# Patient Record
Sex: MALE | Race: WHITE | NOT HISPANIC OR LATINO | Employment: OTHER | URBAN - METROPOLITAN AREA
[De-identification: names, ages, dates, MRNs, and addresses within clinical notes are randomized per-mention and may not be internally consistent; named-entity substitution may affect disease eponyms.]

---

## 2017-01-05 RX ORDER — AMANTADINE HYDROCHLORIDE 100 MG/1
100 CAPSULE, GELATIN COATED ORAL 2 TIMES DAILY
Qty: 180 CAPSULE | Refills: 3 | Status: SHIPPED | OUTPATIENT
Start: 2017-01-05 | End: 2017-12-19 | Stop reason: SDUPTHER

## 2017-01-12 ENCOUNTER — OFFICE VISIT (OUTPATIENT)
Dept: NEUROLOGY | Facility: CLINIC | Age: 52
End: 2017-01-12
Payer: COMMERCIAL

## 2017-01-12 VITALS
DIASTOLIC BLOOD PRESSURE: 74 MMHG | WEIGHT: 217 LBS | HEIGHT: 72 IN | SYSTOLIC BLOOD PRESSURE: 110 MMHG | HEART RATE: 91 BPM | BODY MASS INDEX: 29.39 KG/M2

## 2017-01-12 DIAGNOSIS — G47.9 SLEEP DISORDER: ICD-10-CM

## 2017-01-12 DIAGNOSIS — F63.9 IMPULSE CONTROL DISORDER: ICD-10-CM

## 2017-01-12 DIAGNOSIS — G20.A1 PARKINSON'S DISEASE: Primary | ICD-10-CM

## 2017-01-12 DIAGNOSIS — R42 VERTIGO: ICD-10-CM

## 2017-01-12 PROCEDURE — 99214 OFFICE O/P EST MOD 30 MIN: CPT | Mod: S$GLB,,, | Performed by: PSYCHIATRY & NEUROLOGY

## 2017-01-12 PROCEDURE — 99999 PR PBB SHADOW E&M-EST. PATIENT-LVL II: CPT | Mod: PBBFAC,,, | Performed by: PSYCHIATRY & NEUROLOGY

## 2017-01-12 PROCEDURE — 1159F MED LIST DOCD IN RCRD: CPT | Mod: S$GLB,,, | Performed by: PSYCHIATRY & NEUROLOGY

## 2017-01-12 RX ORDER — PRAMIPEXOLE DIHYDROCHLORIDE 0.5 MG/1
0.5 TABLET ORAL 3 TIMES DAILY
Qty: 90 TABLET | Refills: 4 | Status: SHIPPED | OUTPATIENT
Start: 2017-01-12 | End: 2017-02-22 | Stop reason: SDUPTHER

## 2017-01-12 RX ORDER — PRAMIPEXOLE DIHYDROCHLORIDE 0.5 MG/1
0.5 TABLET ORAL 3 TIMES DAILY
Qty: 90 TABLET | Refills: 4 | Status: SHIPPED | OUTPATIENT
Start: 2017-01-12 | End: 2017-01-12 | Stop reason: SDUPTHER

## 2017-01-12 NOTE — MR AVS SNAPSHOT
[facility]  [address] [facility] [place] [address]  Phone: [phone]  Fax: [phone]                  Reece Hunter III   2017 2:00 PM   Office Visit    Description:  Male : 1965   Provider:  Madison Tracey MD   Department:  [facility]           Reason for Visit     Parkinson's Disease           Diagnoses this Visit        Comments    Parkinson's disease    -  Primary     Sleep disorder         Vertigo         Impulse control disorder                To Do List           Future Appointments        Provider Department Dept Phone    2017 8:00 AM [facility] [phone]    2017 8:30 AM [facility] [phone]    2017 11:30 AM [facility] [phone]    2017 9:20 AM Madison Tracey MD [facility] [phone]      Goals (5 Years of Data)     None      Follow-Up and Disposition     Return in about 3 months (around 2017) for PD.    Follow-up and Disposition History       These Medications        Disp Refills Start End    pramipexole (MIRAPEX) 0.5 MG tablet 90 tablet 4 2017     Take 1 tablet (0.5 mg total) by mouth 3 (three) times daily. - Oral    Pharmacy: [email or web address] #: [phone]         [facility] Call     [facility] Care [facility] -  Assistance  Registered nurses in the [facility] provide clinical advisement, health education, appointment booking, and other advisory services.  Call for this free service at [phone].             Medications           Message regarding Medications     Verify the changes and/or additions to your medication regime listed below are the same as discussed with your clinician today.  If any of these changes or additions are incorrect, please notify your healthcare provider.        CHANGE how you are taking these medications      Start Taking Instead of    pramipexole (MIRAPEX) 0.5 MG tablet pramipexole (MIRAPEX) 0.75 MG tablet    Dosage:  Take 1 tablet (0.5 mg total) by mouth 3 (three) times daily. Dosage:  Take 0.75 mg by mouth 3 (three) times daily.    Reason for Change:  Reorder            Verify that the below list of medications is an accurate representation of the medications you are currently taking.  If none reported, the list may be blank. If incorrect, please contact your healthcare provider. Carry this list with you in case of emergency.           Current Medications     amantadine HCl (SYMMETREL) 100 mg capsule Take 1 capsule (100 mg total) by mouth 2 (two) times daily. Take 100 mg by mouth 2 (two) times daily.    carbidopa-levodopa-entacapone -200 mg (STALEVO) -200 mg Tab Take 1 tablet by mouth 4 (four) times daily.    pramipexole (MIRAPEX) 0.5 MG tablet Take 1 tablet (0.5 mg total) by mouth 3 (three) times daily.    selegiline HCl (ELDEPRYL) 5 mg tablet TAKE TWO TABLETS BY MOUTH EVERY DAY    sildenafil (VIAGRA) 50 MG tablet Take 1 tablet (50 mg total) by mouth daily as needed for Erectile Dysfunction.           Clinical Reference Information           Vital Signs - Last Recorded  Most recent update: 1/12/2017  2:36 PM by Annie Espinoza MA    BP Pulse Ht Wt BMI    110/74 (BP Location: Left arm, Patient Position: Sitting, BP Method: Automatic) 91 6' (1.829 m) 98.4 kg (217 lb) 29.43 kg/m2      Blood Pressure          Most Recent Value    BP  110/74      Allergies as of 1/12/2017     Doxycycline      Immunizations Administered on Date of Encounter - 1/12/2017     None      Orders Placed During Today's Visit     Future Labs/Procedures Expected by Expires    MRA Brain  1/12/2017 1/12/2018    MRA Neck W WO Contrast  1/12/2017 1/12/2018    MRI Brain Without Contrast  1/12/2017 1/12/2018      [facility] Sign-Up     Activating your [facility] account is as easy as 1-2-3!     1) Visit [email or web address], select Sign Up Now,  enter this activation code and your date of birth, then select Next.  [email or web address]  Expires: 2/26/2017  2:32 PM      2) Create a username and password to use when you visit [facility] in the future and select a security question in case you lose your password and select Next.    3) Enter your e-mail address and click Sign Up!    Additional Information  If you have questions, please e-mail [email or web address] or call [phone] to talk to our [facility] staff. Remember, [facility] is NOT to be used for urgent needs. For medical emergencies, dial 911.         Instructions        Upcoming Dates I will be out of the office:  February 24th- March 1st  April 10- 14

## 2017-01-12 NOTE — PROGRESS NOTES
"Reece Hunter III  I. Chief Complaints during this visit:  New Patient visit for  PD    No referring provider defined for this encounter.    Primary Care Physician  Primary Doctor No  No address on file        History of present illness:   51 y.o.  male seen in f/u for PD.  Accompanied by wife of 20yrs.  Had vertigo yesterday, spinning with nausea.  This is his third episode in 15 years.  Resolved with meclizine.    For past 2 weeks, his legs have felt "dead" or "like logs."  More difficulty picking up feet.    Sleep has been fragmented in this time frame.  Sleeping pill causes hang-over effect.    Volunteers that he is surfing tv at night for sexual content.  Libido is higher than in past.  Also over-spending.  Gives example of spending more than $20 on lunch when "I didn't need to."    Interval history 9/30/16:  Could not tolerate going off of amantadine, though he was "dying."  Went back on it and then up on the stalevo and feels better.  Yells at night.  Bedtime around 10pm  No hypersexuality, over-spending, sleep attacks.    Interval history 9/12/16:  ..consultation at the request of  Dr. Banks for evaluation of PD.  Accompanied by sister.  Wife taking care of her dad.  Darren is retiring, so he needs new neurologist.  Also followed at [facility] (Akshat De La Vega) about 2x/year.  Legs feel heavy, he is slower, speech stutters.  Left side is worse than right.  Constipation.  Intermittent urinary leakage.  He is still on 75 of stalevo (x 4 years).  Has tremor intermittently bilateral.  No diplopia.  Easily tearful.  Endorses feeling of someone behind him for past couple months (1-2x/week).  , owns company, still working, but struggling.  Driving is more difficult- turning and reactions.  He has been pulled over a couple of times.    Screams in sleep, grabbed wife in sleep, so she is no longer sleeping in same room.  He sleeps about 3-4hrs at a time.  Taking clonazepam.    Previously a research " patient for [employer] (pioglitazone) and, later, [employer] (inosine) with Dr. Dillard.  Dr. Dillard also took care of his mother for PD.      II.  Review of systems  As in HPI,  otherwise, balance 3 systems reviewed and are negative.    III.  Past Medical History   Diagnosis Date    Parkinson's disease 12/21/2012     Family History   Problem Relation Age of Onset    Parkinsonism Mother      Social History     Social History    Marital status:      Spouse name: N/A    Number of children: N/A    Years of education: N/A     Social History Main Topics    Smoking status: Never Smoker    Smokeless tobacco: Not on file    Alcohol use 1.8 oz/week     3 Cans of beer per week    Drug use: No    Sexual activity: Not on file     Other Topics Concern    Not on file     Social History Narrative    Has twin girls, 13       Current meds:  stalevo 100 qid (6am, 11am, 4pm and 9pm)  mirapex 0.75mg tid (6, 11, 4)  Selegiline 5mg twice daily (6, 11)  Amantadine 100mg twice daily (6, 11)  Clonazepam (rarely takes) prn      PRIOR problem-specific medications tried:  nuedexta (never filled as pharmacy refused)    Review of patient's allergies indicates:   Allergen Reactions    Doxycycline        IV. Physical Exam    There were no vitals filed for this visit.  General appearance: Well nourished, well developed, no acute distress.              -------------------------------------------------------------  Facial Expression: normal       Affect: full       Orientation to time & place:  Oriented to time, place, person and situation       Speech:  Monotone, normal volume  -------------------------------------------------------  Cranial nerves: normal visual acuity, visual fields full, optic discs not visualized, pupils equal round and reactive, extraocular movements intact but few saccades,        -------------------------------------------------------    Cerebellar and Coordination  Gait:  normal       Finger-nose: no dysmetria      "  Rapid Alternating Movements (pronation/supination): Bilateral is MARKED apraxia of hands  --------------------------------------------------------------  MOVEMENT DISORDERS FOCUSED EXAM  Abnormality of movement (bradykinesia, hyperkinesia) present? Yes, 3: Moderate: Moderate global slowness and poverty of spontaneous movements.     Tremor present?   No   Posture:  normal  Postural stability:  no Rhomberg    V.  Laboratory/ Radiological Data:   None available           VI. Medical Decision Making  Diagnosis:   PD                   Assessment:    1.   Moderate rigid-type PD x 4 years.  Possible psp given his very slow saccades.   2.   Impulse control disorder with hypersexuality and over-spending.  3.    Vertigo, "dead legs" probably benign symptoms, but I do not have any imaging to r/u vascular pd, vascular stenosis nor even assess for PSP  4.   RBD  5.  Pseudobulbar affect/ lability  6.  Illusions of movement, someone behind him could worsen into more overt hallucinations/psychosis.  7. Family h/o PD     Treatment plan:  1.  Reduce mpx to 0.5mg tid          2.  Mri/mra brain  3.    4.               Tests ordered during this visit:   Orders Placed This Encounter   Procedures    MRI Brain Without Contrast    MRA Brain    MRA Neck W WO Contrast         No Follow-up on file.  Kelli level 2.  "

## 2017-01-24 ENCOUNTER — TELEPHONE (OUTPATIENT)
Dept: NEUROLOGY | Facility: CLINIC | Age: 52
End: 2017-01-24

## 2017-01-24 NOTE — TELEPHONE ENCOUNTER
----- Message from Hannah Lara sent at 1/24/2017 10:55 AM CST -----  Contact: Patient   Patient called and requested to cancel MRI's scheduled for tomorrow - Stated he is unable to pay estimated out of pocket - Stated he received referral to have services rendered at [facility] - Please reach patient regarding [phone]

## 2017-02-07 ENCOUNTER — TELEPHONE (OUTPATIENT)
Dept: NEUROLOGY | Facility: CLINIC | Age: 52
End: 2017-02-07

## 2017-02-07 NOTE — TELEPHONE ENCOUNTER
Called and spoke to  about faxing his orders of MRI/MRA. I stated to that I faxed it to his home fax. He stated he did get it

## 2017-02-07 NOTE — TELEPHONE ENCOUNTER
----- Message from Mya Peterson sent at 2/7/2017  9:52 AM CST -----  Contact: Reece Santacruz is calling to have orders for MRI to be faxed to home number [phone] in order to take to [facility] to have scheduled as wife works there. Any questions please call [phone]. Thanks!

## 2017-02-21 ENCOUNTER — TELEPHONE (OUTPATIENT)
Dept: NEUROLOGY | Facility: CLINIC | Age: 52
End: 2017-02-21

## 2017-02-21 NOTE — TELEPHONE ENCOUNTER
----- Message from Jeaneth Raman sent at 2/20/2017 11:19 AM CST -----  Contact: self @ [phone]  Pt says he would like to speak with dr donohue concerning the adjustment of his parkinson's medication.  pls call.

## 2017-02-22 ENCOUNTER — TELEPHONE (OUTPATIENT)
Dept: NEUROLOGY | Facility: CLINIC | Age: 52
End: 2017-02-22

## 2017-02-22 DIAGNOSIS — F63.9 IMPULSE CONTROL DISORDER: ICD-10-CM

## 2017-02-22 DIAGNOSIS — G20.A1 PD (PARKINSON'S DISEASE): Primary | ICD-10-CM

## 2017-02-22 DIAGNOSIS — G20.A1 PARKINSON'S DISEASE: ICD-10-CM

## 2017-02-22 RX ORDER — PRAMIPEXOLE DIHYDROCHLORIDE 0.5 MG/1
0.5 TABLET ORAL 2 TIMES DAILY
Qty: 60 TABLET | Refills: 4 | Status: SHIPPED | OUTPATIENT
Start: 2017-02-22 | End: 2017-04-05 | Stop reason: SDUPTHER

## 2017-02-22 RX ORDER — CARBIDOPA, LEVODOPA AND ENTACAPONE 37.5; 200; 15 MG/1; MG/1; MG/1
1 TABLET, FILM COATED ORAL 4 TIMES DAILY
Qty: 120 TABLET | Refills: 11 | Status: SHIPPED | OUTPATIENT
Start: 2017-02-22 | End: 2018-02-22

## 2017-02-22 RX ORDER — CARBIDOPA, LEVODOPA AND ENTACAPONE 37.5; 200; 15 MG/1; MG/1; MG/1
1 TABLET, FILM COATED ORAL 4 TIMES DAILY
Qty: 120 TABLET | Refills: 11 | Status: SHIPPED | OUTPATIENT
Start: 2017-02-22 | End: 2017-02-22 | Stop reason: SDUPTHER

## 2017-02-22 NOTE — TELEPHONE ENCOUNTER
----- Message from Brodie Mora sent at 2/22/2017  8:30 AM CST -----  Contact: PT  David bush,     [phone]

## 2017-02-22 NOTE — TELEPHONE ENCOUNTER
Returned call to pt and he states he needs to MRI orders sent to [facility]. He states since the reduction in the mpx he has been very stiff in the legs and hard time picking up his legs. He states there was talk about increasing the Stalevo and would like to know if this could be done. He states he has more shaking when he is driving as well.

## 2017-02-22 NOTE — TELEPHONE ENCOUNTER
Problem List Items Addressed This Visit     Impulse control disorder    Overview     Money, sex, food- while on mpx 0.75         Current Assessment & Plan     Patient reports no change in spending, hypersexuality with reduction in mpx from 0.75 tid to 0.5 tid.    -> Reduce mpx again.  Take morning, noon, only.         Parkinson's disease    Overview     2/2017 Moderate rigid-type PD x 4 years. Possible psp given his very slow saccades.         Current Assessment & Plan     Increase stalevo to 150 qid.  Reschedule MRI for EJ.           Other Visit Diagnoses     PD (Parkinson's disease)    -  Primary    Relevant Medications    carbidopa-levodopa-entacapone 37.5-150-200 mg (STALEVO) 37.5-150-200 mg Tab    pramipexole (MIRAPEX) 0.5 MG tablet    Other Relevant Orders    MRA Neck W WO Contrast    MRA Brain    MRI Brain Without Contrast

## 2017-02-22 NOTE — ASSESSMENT & PLAN NOTE
Patient reports no change in spending, hypersexuality with reduction in mpx from 0.75 tid to 0.5 tid.    -> Reduce mpx again.  Take morning, noon, only.

## 2017-02-22 NOTE — TELEPHONE ENCOUNTER
----- Message from Shadia Vega sent at 2/21/2017 10:53 AM CST -----  Contact: pt [phone]  Pt is returning nurse call.pls call

## 2017-03-30 DIAGNOSIS — G20.A1 PD (PARKINSON'S DISEASE): ICD-10-CM

## 2017-03-30 NOTE — TELEPHONE ENCOUNTER
----- Message from Marcia Mancuso sent at 3/30/2017  1:37 PM CDT -----  Contact: self [phone]  He is requesting a refill of selegiline be sent to:    [facility] [place] [facility] - [address] [facility]  [address]  [place] [address]  Phone: [phone] Fax: [phone]  Thank you!

## 2017-03-31 RX ORDER — SELEGILINE HYDROCHLORIDE 5 MG/1
10 TABLET ORAL DAILY
Qty: 60 TABLET | Refills: 5 | Status: SHIPPED | OUTPATIENT
Start: 2017-03-31 | End: 2017-09-26 | Stop reason: SDUPTHER

## 2017-04-05 ENCOUNTER — OFFICE VISIT (OUTPATIENT)
Dept: NEUROLOGY | Facility: CLINIC | Age: 52
End: 2017-04-05
Payer: COMMERCIAL

## 2017-04-05 VITALS
HEART RATE: 69 BPM | SYSTOLIC BLOOD PRESSURE: 105 MMHG | DIASTOLIC BLOOD PRESSURE: 71 MMHG | HEIGHT: 73 IN | WEIGHT: 220.38 LBS | BODY MASS INDEX: 29.21 KG/M2

## 2017-04-05 DIAGNOSIS — G20.A1 PD (PARKINSON'S DISEASE): ICD-10-CM

## 2017-04-05 DIAGNOSIS — F63.9 IMPULSE CONTROL DISORDER: ICD-10-CM

## 2017-04-05 DIAGNOSIS — G20.A1 PARKINSON'S DISEASE: Primary | ICD-10-CM

## 2017-04-05 PROCEDURE — 99214 OFFICE O/P EST MOD 30 MIN: CPT | Mod: S$GLB,,, | Performed by: PSYCHIATRY & NEUROLOGY

## 2017-04-05 PROCEDURE — 99999 PR PBB SHADOW E&M-EST. PATIENT-LVL III: CPT | Mod: PBBFAC,,, | Performed by: PSYCHIATRY & NEUROLOGY

## 2017-04-05 PROCEDURE — 1160F RVW MEDS BY RX/DR IN RCRD: CPT | Mod: S$GLB,,, | Performed by: PSYCHIATRY & NEUROLOGY

## 2017-04-05 RX ORDER — PRAMIPEXOLE DIHYDROCHLORIDE 0.25 MG/1
0.25 TABLET ORAL 3 TIMES DAILY
Qty: 90 TABLET | Refills: 11 | Status: SHIPPED | OUTPATIENT
Start: 2017-04-05

## 2017-04-05 RX ORDER — CLONAZEPAM 0.5 MG/1
0.5 TABLET ORAL NIGHTLY PRN
COMMUNITY

## 2017-04-05 RX ORDER — PRAMIPEXOLE DIHYDROCHLORIDE 0.25 MG/1
0.25 TABLET ORAL 3 TIMES DAILY
Qty: 90 TABLET | Refills: 11 | Status: SHIPPED | OUTPATIENT
Start: 2017-04-05 | End: 2017-04-05 | Stop reason: SDUPTHER

## 2017-04-05 NOTE — ASSESSMENT & PLAN NOTE
Patient reports no change in spending, hypersexuality with reduction in mpx from 0.75 tid to 0.5 BID    -> Reduce mpx again.  Take 1/2 pill tid.

## 2017-04-05 NOTE — PROGRESS NOTES
"Reece Hunter III  I. Chief Complaints during this visit:  New Patient visit for  PD    No referring provider defined for this encounter.    Primary Care Physician  Primary Doctor No  No address on file        History of present illness:   51 y.o.  male seen in f/u for PD.  Unaccompanied.  Despite reducing mpx, he still has hypersexuality and over-spending.  Wife is not here to corroborate, but he says behaviors have not changed "much."  There is still tension surrounding these behaviors between he and wife.    Stalevo helps significantly with coordination/ movement of legs.    Interval history 1/12/17:  Accompanied by wife of 20yrs.  Had vertigo yesterday, spinning with nausea.  This is his third episode in 15 years.  Resolved with meclizine.  For past 2 weeks, his legs have felt "dead" or "like logs."  More difficulty picking up feet.  Sleep has been fragmented in this time frame.  Sleeping pill causes hang-over effect.  Volunteers that he is surfing tv at night for sexual content.  Libido is higher than in past.  Also over-spending.  Gives example of spending more than $20 on lunch when "I didn't need to."    Interval history 9/30/16:  Could not tolerate going off of amantadine, though he was "dying."  Went back on it and then up on the stalevo and feels better.  Yells at night.  Bedtime around 10pm  No hypersexuality, over-spending, sleep attacks.    Interval history 9/12/16:  ..consultation at the request of  Dr. Banks for evaluation of PD.  Accompanied by sister.  Wife taking care of her dad.  Darren is retiring, so he needs new neurologist.  Also followed at [facility] (Akshat De La Vega) about 2x/year.  Legs feel heavy, he is slower, speech stutters.  Left side is worse than right.  Constipation.  Intermittent urinary leakage.  He is still on 75 of stalevo (x 4 years).  Has tremor intermittently bilateral.  No diplopia.  Easily tearful.  Endorses feeling of someone behind him for past couple months " (1-2x/week).  , owns company, still working, but struggling.  Driving is more difficult- turning and reactions.  He has been pulled over a couple of times.    Screams in sleep, grabbed wife in sleep, so she is no longer sleeping in same room.  He sleeps about 3-4hrs at a time.  Taking clonazepam.    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 Dillard.  Dr. Dillard also took care of his mother for PD.      II.  Review of systems  As in HPI,  otherwise, balance 3 systems reviewed and are negative.    III.  Past Medical History:   Diagnosis Date    Impulse control disorder 1/12/2017    Money, sex, food- while on mpx 0.75    Parkinson's disease 12/21/2012    Vertigo 1/12/2017    3 episodes since 2000     Family History   Problem Relation Age of Onset    Parkinsonism Mother      Social History     Social History    Marital status:      Spouse name: N/A    Number of children: N/A    Years of education: N/A     Social History Main Topics    Smoking status: Never Smoker    Smokeless tobacco: None    Alcohol use 1.8 oz/week     3 Cans of beer per week    Drug use: No    Sexual activity: Not Asked     Other Topics Concern    None     Social History Narrative    Has twin girls, 13       Current meds:  stalevo 150 qid (6am, 11am, 4pm and 8pm)  mirapex 0.5mg tid  Selegiline 5mg bid  Amantadine 100mg bid  Clonazepam prn    Meds as of 1/12/17:  stalevo 100 qid (6am, 11am, 4pm and 9pm)  mirapex 0.75mg tid (6, 11, 4)  Selegiline 5mg twice daily (6, 11)  Amantadine 100mg twice daily (6, 11)  Clonazepam (rarely takes) prn      PRIOR problem-specific medications tried:  nuedexta (never filled as pharmacy refused); mpx > 0.5 causes hypersexuality.    Review of patient's allergies indicates:   Allergen Reactions    Doxycycline        IV. Physical Exam    Vitals:    04/05/17 0919   BP: 105/71   BP Location: Left arm   Patient Position: Sitting   BP Method:  "Automatic   Pulse: 69   Weight: 100 kg (220 lb 5.6 oz)   Height: 6' 1" (1.854 m)     General appearance: Well nourished, well developed, no acute distress.              -------------------------------------------------------------  Facial Expression: normal       Affect: full       Orientation to time & place:  Oriented to time, place, person and situation       Speech:  Monotone, normal volume  -------------------------------------------------------  Cranial nerves: normal visual acuity, visual fields full, optic discs not visualized, pupils equal round and reactive, extraocular movements intact but few saccades,        -------------------------------------------------------    Cerebellar and Coordination  Gait:  normal       Finger-nose: no dysmetria       Rapid Alternating Movements (pronation/supination): Bilateral is MARKED apraxia of hands  --------------------------------------------------------------  MOVEMENT DISORDERS FOCUSED EXAM  Abnormality of movement (bradykinesia, hyperkinesia) present? Yes, 3: Moderate: Moderate global slowness and poverty of spontaneous movements.     Tremor present?   No   Posture:  normal  Postural stability:  no Rhomberg    V.  Laboratory/ Radiological Data:   None available           VI. Assessment and Plan    Problem List Items Addressed This Visit     Impulse control disorder    Overview     Money, sex, food- while on mpx 0.75         Current Assessment & Plan     As below.         Parkinson's disease - Primary    Overview     2/2017 Moderate rigid-type PD x 4 years. Possible psp given his very slow saccades.         Current Assessment & Plan     Patient reports no change in spending, hypersexuality with reduction in mpx from 0.75 tid to 0.5 BID    -> Reduce mpx again.  Take 1/2 pill tid.         Relevant Medications    pramipexole (MIRAPEX) 0.25 MG tablet      Other Visit Diagnoses     PD (Parkinson's disease)                     No Follow-up on file.  Kelli level 2.  "

## 2017-04-05 NOTE — MR AVS SNAPSHOT
[facility]  [address]  [place] [address]  Phone: [phone]  Fax: [phone]                  Reece Hunter III   2017 9:20 AM   Office Visit    Description:  Male : 1965   Provider:  Madison Tracey MD   Department:  [place] - Neurology           Reason for Visit     Parkinson's Disease           Diagnoses this Visit        Comments    Parkinson's disease    -  Primary     Impulse control disorder         PD (Parkinson's disease)                To Do List           Goals (5 Years of Data)     None      Follow-Up and Disposition     Return in about 3 months (around 2017) for PD.    Follow-up and Disposition History       These Medications        Disp Refills Start End    pramipexole (MIRAPEX) 0.25 MG tablet 90 tablet 11 2017     Take 1 tablet (0.25 mg total) by mouth 3 (three) times daily. - Oral    Pharmacy: [facility] [address] #: [phone]         [facility] On Call     [facility] On Call Nurse Care Line -  Assistance  Unless otherwise directed by your provider, please contact [facility], our nurse care line that is available for  assistance.     Registered nurses in the [facility] provide: appointment scheduling, clinical advisement, health education, and other advisory services.  Call: [phone] (toll free)               Medications           Message regarding Medications     Verify the changes and/or additions to your medication regime listed below are the same as discussed with your clinician today.  If any of these changes or additions are incorrect, please notify your healthcare provider.        CHANGE how you are taking these medications     Start Taking Instead of    pramipexole (MIRAPEX) 0.25 MG tablet pramipexole (MIRAPEX) 0.5 MG tablet    Dosage:  Take 1 tablet (0.25 mg total) by mouth 3 (three) times daily. Dosage:  Take 1 tablet (0.5 mg total) by mouth 2  "(two) times daily. Take morning and noon.    Reason for Change:  Reorder       STOP taking these medications     sildenafil (VIAGRA) 50 MG tablet Take 1 tablet (50 mg total) by mouth daily as needed for Erectile Dysfunction.           Verify that the below list of medications is an accurate representation of the medications you are currently taking.  If none reported, the list may be blank. If incorrect, please contact your healthcare provider. Carry this list with you in case of emergency.           Current Medications     amantadine HCl (SYMMETREL) 100 mg capsule Take 1 capsule (100 mg total) by mouth 2 (two) times daily. Take 100 mg by mouth 2 (two) times daily.    carbidopa-levodopa-entacapone 37.5-150-200 mg (STALEVO) 37.5-150-200 mg Tab Take 1 tablet by mouth 4 (four) times daily.    clonazePAM (KLONOPIN) 0.5 MG tablet Take 0.5 mg by mouth nightly as needed for Anxiety.    pramipexole (MIRAPEX) 0.25 MG tablet Take 1 tablet (0.25 mg total) by mouth 3 (three) times daily.    selegiline HCl (ELDEPRYL) 5 mg tablet Take 2 tablets (10 mg total) by mouth once daily.           Clinical Reference Information           Your Vitals Were     BP Pulse Height Weight BMI    105/71 (BP Location: Left arm, Patient Position: Sitting, BP Method: Automatic) 69 6' 1" (1.854 m) 100 kg (220 lb 5.6 oz) 29.07 kg/m2      Blood Pressure          Most Recent Value    BP  105/71      Allergies as of 4/5/2017     Doxycycline      Immunizations Administered on Date of Encounter - 4/5/2017     None      [facility] Sign-Up     Activating your [facility] account is as easy as 1-2-3!     1) Visit [email or web address], select Sign Up Now, enter this activation code and your date of birth, then select Next.  [email or web address]  Expires: 5/20/2017  9:44 AM      2) Create a username and password to use when you visit [facility] in the future and select a security question in case you lose your password and select Next.    3) Enter your e-mail address and " click Sign Up!    Additional Information  If you have questions, please e-mail [email or web address] or call [phone] to talk to our [facility] staff. Remember, [facility] is NOT to be used for urgent needs. For medical emergencies, dial 911.         Instructions    04/05/2017         REDUCE THE PRAMIPEXOLE TO 0.25mg, THREE TIMES A DAY (6, 11, 4)        Dear Reece Hunter III,    Due to overwhelming demand, my [place] clinic location books to capacity very quickly every month.      We have two Nurse Practitioners, Catrina Justin and Valencia Amaro, who also see patients with Memory and Movement Disorders in [place].  And when possible, please consider making your appointment in [place], where we have more appointments available in the Movements Disorders Division.       I'd like to see you, again, in either July or August.  However, we have no available appointments at this time.      Please CALL my office in [place] if you have NOT heard from us before June 1, 2017.      Sincerely,       Madison Tracey MD  Director, Movement Disorders and DBS Program  Department of Neurology  [phone]           Language Assistance Services     ATTENTION: Language assistance services are available, free of charge. Please call [phone].      ATENCIÓN: Si chikisla rich, tiene a sena disposición servicios gratuitos de asistencia lingüística. Llame al [phone].     CHÚ Ý: N?u b?n nói Ti?ng Vi?t, có các d?ch v? h? tr? ngôn ng? mi?n phí dành cho b?n. G?i s? [phone].         [facility] complies with applicable Federal civil rights laws and does not discriminate on the basis of race, color, national origin, age, disability, or sex.

## 2017-04-05 NOTE — PATIENT INSTRUCTIONS
04/05/2017         REDUCE THE PRAMIPEXOLE TO 0.25mg, THREE TIMES A DAY (6, 11, 4)        Dear Reece Hunter III,    Due to overwhelming demand, my [place] clinic location books to capacity very quickly every month.      We have two Nurse Practitioners, Catrina Justin and Valencia Amaro, who also see patients with Memory and Movement Disorders in [place].  And when possible, please consider making your appointment in [place], where we have more appointments available in the Movements Disorders Division.       I'd like to see you, again, in either July or August.  However, we have no available appointments at this time.      Please CALL my office in [place] if you have NOT heard from us before June 1, 2017.      Sincerely,       Madison Tracey MD  Director, Movement Disorders and DBS Program  Department of Neurology  [phone]

## 2017-04-24 ENCOUNTER — TELEPHONE (OUTPATIENT)
Dept: NEUROLOGY | Facility: CLINIC | Age: 52
End: 2017-04-24

## 2017-04-24 NOTE — TELEPHONE ENCOUNTER
----- Message from Shadia Vega sent at 4/24/2017 12:40 PM CDT -----  Contact: carmen([facility]) [phone]  Carmen is calling to get orders for pt to have a mri and mra faxed to her at [phone].thanks

## 2017-05-10 ENCOUNTER — TELEPHONE (OUTPATIENT)
Dept: NEUROLOGY | Facility: CLINIC | Age: 52
End: 2017-05-10

## 2017-05-10 NOTE — TELEPHONE ENCOUNTER
Mr. Thompson called to get understanding on how to take his MIRAPEX. He stated to me that he has been taking 0.5 MG 2 to 3 times a day for a month and it's been giving him problems for as slow movement in his legs. I stated to him as of April 7 Dr. Tracey wants him to take 0.25 MG 3 times a day. He stated to me that might be the problem he was taking the wrong dose. I stated to him that I will let  know what's been happening.

## 2017-05-10 NOTE — TELEPHONE ENCOUNTER
----- Message from Kamini Gonzalez sent at 5/10/2017  8:09 AM CDT -----  Contact: self  Pt is calling to discuss his medication.  Dr Tracey has changed the dosage of his pramipexole (MIRAPEX) 0.25 MG tablet.  Pt stated that he is having a hard time with this medication.  Please call to discuss.    Pt can be reached at [phone]

## 2017-05-16 ENCOUNTER — TELEPHONE (OUTPATIENT)
Dept: NEUROLOGY | Facility: CLINIC | Age: 52
End: 2017-05-16

## 2017-05-16 NOTE — TELEPHONE ENCOUNTER
----- Message from Almita Zapata sent at 5/16/2017  2:31 PM CDT -----  Contact: Patient [phone]  Patient is calling to get his MRI results. Please call

## 2017-07-05 ENCOUNTER — OFFICE VISIT (OUTPATIENT)
Dept: NEUROLOGY | Facility: CLINIC | Age: 52
End: 2017-07-05
Payer: COMMERCIAL

## 2017-07-05 VITALS
BODY MASS INDEX: 29.7 KG/M2 | SYSTOLIC BLOOD PRESSURE: 112 MMHG | WEIGHT: 224.13 LBS | DIASTOLIC BLOOD PRESSURE: 74 MMHG | HEART RATE: 73 BPM | HEIGHT: 73 IN

## 2017-07-05 DIAGNOSIS — F48.2 PSEUDOBULBAR AFFECT: ICD-10-CM

## 2017-07-05 DIAGNOSIS — R13.12 OROPHARYNGEAL DYSPHAGIA: ICD-10-CM

## 2017-07-05 DIAGNOSIS — F63.9 IMPULSE CONTROL DISORDER: ICD-10-CM

## 2017-07-05 DIAGNOSIS — G20.A1 PARKINSON'S DISEASE: Primary | ICD-10-CM

## 2017-07-05 PROCEDURE — 99999 PR PBB SHADOW E&M-EST. PATIENT-LVL III: CPT | Mod: PBBFAC,,, | Performed by: PSYCHIATRY & NEUROLOGY

## 2017-07-05 PROCEDURE — 99214 OFFICE O/P EST MOD 30 MIN: CPT | Mod: S$GLB,,, | Performed by: PSYCHIATRY & NEUROLOGY

## 2017-07-05 NOTE — ASSESSMENT & PLAN NOTE
Wearing off early, dosing limited by side-effects and only 51 years old.  So possible candidate for DBS.   -> discussed in full today, but this was first conversation   -> no changes in meds today   -> next visit with Catrina for DBS education with he and wife (as education, not pre-op).   -> will need a formal off visit and discussion in Interdisciplinary panel given his emotional lability.

## 2017-07-05 NOTE — PATIENT INSTRUCTIONS
I am considering Deep Brain Stimulation as a treatment for you.  I'm not fully ready to recommend, but I do want you and your wife to have some education and information on this treatment.      DEEP BRAIN STIMULATION SURGERY  [facility] APPOINTMENTS   (Contact: Annie Espinoza, [phone])    ______________ (1-6 months prior to surgery) DBS evaluation for Candidacy   ______________ About DBS, the surgery, expectations and goals.                                 Behavioral and cognitive screening, where applicable.    ______________ Neuropsychological Testing, where applicable.      PRE-SURGICAL EVALUATIONS (contact: Aspen Ornelas, [phone])  You will need to see your Primary Care Doctor to clear you  for surgery within 30 days of procedure.  Tests include:  CBC, CMP, PT/PTT, INR, UA), chest x-ray and ECG.    Final reports should be faxed to surgeons office (fax [phone]).    ______________ MRI brain scan  ______________ Meet with neurosurgeon within 2 weeks of procedure.  ______________ Meet with anesthesia nurse or MD for pre-operative education          SURGERY DATES (ESTIMATE WE WILL PLAN FOR SURGERY IN THE NEXT 6-12 MONTHS)  ______________ (Monday) Procedure to place fiducials (screws) and Admission to hospital.   Prepare to be at hospital for first procedure at 5:00am.    Please do not take medications for Parkinsons disease or Essential tremor (if applicable) after Midnight Tuesday night.    ______________ (Tuesday) Implantation of lead(s), Return to hospital room for night.  ______________ (Wednesday) Return home and resume all prior medications  ______________ (1 Week After DBS Surgery) Outpatient surgery to place generator(s)        FOLLOW-UP CLINIC APPOINTMENTS    ______________ (~2 Weeks after DBS Surgery) Post-operative appointment with surgeon.  (contact: Aspen Ornelas, [phone])  ______________ (3-5 Weeks After DBS Surgery) First programming visit (Neurology)  (contact: Annie  Alexis, [phone])   If you take medications for Parkinsons Disease, bring these with you, but do not take them on day of this appointment until told to do so.

## 2017-07-05 NOTE — ASSESSMENT & PLAN NOTE
Concerning symptom for atypical PD (along with the pseudobulbar affect).   -> ST eval   -> DBS consideration, but will need Interdisciplinary panel to decide

## 2017-07-05 NOTE — PROGRESS NOTES
"Reece Hunter III  I. Chief Complaints during this visit:  f/u Patient visit for  PD    No referring provider defined for this encounter.    Primary Care Physician  Primary Doctor No  No address on file        History of present illness:   51 y.o.  male seen in f/u for PD.  Unaccompanied.  At last visit, I reduced mpx to half pill tid to try and reduce hypersexuality.  He found he was getting more stiff and locked up, so he went back to whole pill tid.    Wearing off (he calls it a "lull") where he has trouble walking.  This is true if he stops moving, but occurs also between 9-10.    Interval history 4/5/17:  Despite reducing mpx, he still has hypersexuality and over-spending.  Wife is not here to corroborate, but he says behaviors have not changed "much."  There is still tension surrounding these behaviors between he and wife.  Stalevo helps significantly with coordination/ movement of legs.    Interval history 1/12/17:  Accompanied by wife of 20yrs.  Had vertigo yesterday, spinning with nausea.  This is his third episode in 15 years.  Resolved with meclizine.  For past 2 weeks, his legs have felt "dead" or "like logs."  More difficulty picking up feet.  Sleep has been fragmented in this time frame.  Sleeping pill causes hang-over effect.  Volunteers that he is surfing tv at night for sexual content.  Libido is higher than in past.  Also over-spending.  Gives example of spending more than $20 on lunch when "I didn't need to."    Interval history 9/30/16:  Could not tolerate going off of amantadine, though he was "dying."  Went back on it and then up on the stalevo and feels better.  Yells at night.  Bedtime around 10pm  No hypersexuality, over-spending, sleep attacks.    Interval history 9/12/16:  ..consultation at the request of  Dr. Banks for evaluation of PD.  Accompanied by sister.  Wife taking care of her dad.  Darren is retiring, so he needs new neurologist.  Also followed at [facility] (Akshat De La Vega) " about 2x/year.  Legs feel heavy, he is slower, speech stutters.  Left side is worse than right.  Constipation.  Intermittent urinary leakage.  He is still on 75 of stalevo (x 4 years).  Has tremor intermittently bilateral.  No diplopia.  Easily tearful.  Endorses feeling of someone behind him for past couple months (1-2x/week).  , owns company, still working, but struggling.  Driving is more difficult- turning and reactions.  He has been pulled over a couple of times.    Screams in sleep, grabbed wife in sleep, so she is no longer sleeping in same room.  He sleeps about 3-4hrs at a time.  Taking clonazepam.    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 Dillard.  Dr. Dillard also took care of his mother for PD.      II.  Review of systems  As in HPI,  otherwise, balance 3 systems reviewed and are negative.    III.  Past Medical History:   Diagnosis Date    Impulse control disorder 1/12/2017    Money, sex, food- while on mpx 0.75    Parkinson's disease 12/21/2012    Vertigo 1/12/2017    3 episodes since 2000     Family History   Problem Relation Age of Onset    Parkinsonism Mother      Social History     Social History    Marital status:      Spouse name: N/A    Number of children: N/A    Years of education: N/A     Social History Main Topics    Smoking status: Never Smoker    Smokeless tobacco: None    Alcohol use 1.8 oz/week     3 Cans of beer per week    Drug use: No    Sexual activity: Not Asked     Other Topics Concern    None     Social History Narrative    Has twin girls, 13       Current meds:  stalevo 150 qid (6am, 11am, 4pm and 8pm)  mirapex 0.25mg tid  Selegiline 5mg bid  Amantadine 100mg bid  Clonazepam prn    Meds as of 1/12/17:  stalevo 100 qid (6am, 11am, 4pm and 9pm)  mirapex 0.75mg tid (6, 11, 4)  Selegiline 5mg twice daily (6, 11)  Amantadine 100mg twice daily (6, 11)  Clonazepam (rarely takes) prn      PRIOR  "problem-specific medications tried:  nuedexta (never filled as pharmacy refused); mpx > 0.5 causes hypersexuality.    Review of patient's allergies indicates:   Allergen Reactions    Doxycycline        IV. Physical Exam    Vitals:    07/05/17 0924   BP: 112/74   BP Location: Left arm   Patient Position: Sitting   BP Method: Automatic   Pulse: 73   Weight: 101.6 kg (224 lb 1.6 oz)   Height: 6' 1" (1.854 m)     General appearance: Well nourished, well developed, no acute distress.   Wearing strong cologne           -------------------------------------------------------------  Facial Expression: normal       Affect: full       Orientation to time & place:  Oriented to time, place, person and situation       Speech:  Monotone, normal volume  -------------------------------------------------------  Cranial nerves: normal visual acuity, visual fields full, optic discs not visualized, pupils equal round and reactive, extraocular movements intact but few saccades,        -------------------------------------------------------    Cerebellar and Coordination  Gait:  normal       Finger-nose: no dysmetria       Rapid Alternating Movements (pronation/supination): Bilateral is MARKED apraxia of hands  --------------------------------------------------------------  MOVEMENT DISORDERS FOCUSED EXAM  Abnormality of movement (bradykinesia, hyperkinesia) present? Yes, 3: Moderate: Moderate global slowness and poverty of spontaneous movements.     Tremor present?   No   Posture:  normal  Postural stability:  no Rhomberg    V.  Laboratory/ Radiological Data:   None available           VI. Assessment and Plan    Problem List Items Addressed This Visit        1 - High    Parkinson's disease - Primary    Overview     2/2017 Moderate rigid-type PD x 4 years. Possible psp given his very slow saccades, dysphagia and pseudobulbar affect.         Current Assessment & Plan     Wearing off early, dosing limited by side-effects and only 51 years " old.  So possible candidate for DBS.   -> discussed in full today, but this was first conversation   -> no changes in meds today   -> next visit with Catrina for DBS education with he and wife (as education, not pre-op).   -> will need a formal off visit and discussion in Interdisciplinary panel given his emotional lability.            2     Impulse control disorder    Overview     Money, sex, food- while on mpx 0.75            3     Pseudobulbar affect       5     Oropharyngeal dysphagia    Current Assessment & Plan     Concerning symptom for atypical PD (along with the pseudobulbar affect).   -> ST eval   -> DBS consideration, but will need Interdisciplinary panel to decide         Relevant Orders    Ambulatory referral to Speech Therapy    Fl Modified Barium Swallow Speech      Other Visit Diagnoses    None.                Return in about 2 months (around 9/5/2017) for PD with Catrina Justin NP.  Kelli level 2.

## 2017-08-22 ENCOUNTER — TELEPHONE (OUTPATIENT)
Dept: NEUROLOGY | Facility: CLINIC | Age: 52
End: 2017-08-22

## 2017-08-22 NOTE — TELEPHONE ENCOUNTER
----- Message from Almita Zapata sent at 8/22/2017 10:51 AM CDT -----  Contact: Patient [phone]  Patient is calling to find out what dosage on his carbidopa-levodopa-entacapone 37.5-150-200 mg (STALEVO) 37.5-150-200 mg Tab, should he be taking right now. Please call

## 2017-08-22 NOTE — TELEPHONE ENCOUNTER
Called and left a message regarding his medication. I stated that he takes his STALEVO 1 tablet four times a day.

## 2017-08-24 ENCOUNTER — TELEPHONE (OUTPATIENT)
Dept: NEUROLOGY | Facility: CLINIC | Age: 52
End: 2017-08-24

## 2017-08-24 NOTE — TELEPHONE ENCOUNTER
----- Message from Jeaneth Raman sent at 8/24/2017  8:58 AM CDT -----  Contact: self @ [phone]  Pt is calling to see what dosage of carbidopa-levodopa he is suppose to be taking.

## 2017-08-24 NOTE — TELEPHONE ENCOUNTER
Called and left a message advising patient to take his medication as instructed per Dr. Tracey during last visit. Take 1 tablet by mouth 4 times daily.

## 2017-09-26 DIAGNOSIS — G20.A1 PD (PARKINSON'S DISEASE): ICD-10-CM

## 2017-09-27 RX ORDER — SELEGILINE HYDROCHLORIDE 5 MG/1
TABLET ORAL
Qty: 60 TABLET | Refills: 5 | Status: SHIPPED | OUTPATIENT
Start: 2017-09-27

## 2017-12-19 RX ORDER — AMANTADINE HYDROCHLORIDE 100 MG/1
CAPSULE, GELATIN COATED ORAL
Qty: 180 CAPSULE | Refills: 3 | Status: SHIPPED | OUTPATIENT
Start: 2017-12-19 | End: 2018-12-17 | Stop reason: SDUPTHER

## 2018-12-18 RX ORDER — AMANTADINE HYDROCHLORIDE 100 MG/1
CAPSULE, GELATIN COATED ORAL
Qty: 180 CAPSULE | Refills: 3 | Status: SHIPPED | OUTPATIENT
Start: 2018-12-18

## 2021-08-19 ENCOUNTER — TELEPHONE (OUTPATIENT)
Dept: NEUROLOGY | Facility: CLINIC | Age: 56
End: 2021-08-19
Payer: MEDICARE

## 2024-05-21 NOTE — H&P (VIEW-ONLY)
Neurosurgery H&P   Stereotactic & Functional Neurosurgery     HPI:   Reece Hunter III is a 58 y.o. right-handed male who presents as a referral from Dr. Lopez for consideration of DBS pulse generator at end of service. The patient reports that symptoms began in 2011 with primary complaint of gait issues. He underwent bilateral STN DBS in 2019; the right side was anterior and the left medial/ventral to treat symptoms of dyskinesias, wearing off with rigidity and akinesia. He had a PEG placed in 2022 for excessive weight loss, then underwent explantation of the bilateral STN with replacement with bilateral Gpi in September 2022.     Wife, Sepideh, is a respiratory therapist at . She notes that the DBS was mistakenly turned off soon after the device was placed, and he was severely disabled. They would like to replace the generator before that happens again.     He takes 4 cartons a day/tube feeds.     The patient denies any bleeding, infectious, or anesthetic complications with any previous surgery. He does not take any AC/AP agents.     Current Outpatient Medications on File Prior to Visit   Medication Sig Dispense Refill    carbidopa-levodopa (SINEMET)  mg per tablet Take 2.5 tablets by mouth every 3 (three) hours      clonazePAM (KLONOPIN) 0.25 mg disintegrating tablet Take 1 tablet by mouth nightly as needed      escitalopram oxalate (LEXAPRO) 5 MG tablet Take 1 tablet by mouth daily 90 tablet 3    midodrine (PROAMATINE) 10 MG tablet Take 1 tablet by mouth 2 (two) times a day 60 tablet 11    mirtazapine (REMERON) 15 MG tablet       potassium chloride (KLOR-CON) 20 mEq packet Take 20 mEq by mouth daily 30 packet 11     No current facility-administered medications on file prior to visit.       Past Medical History:   Diagnosis Date    Anxiety     Depression     Insomnia     Parkinson's disease (CMS/[facility])        Past Surgical History:   Procedure Laterality Date    DBS IMPLANT  2020 2022        Family History   Problem Relation Age of Onset    Other Mother         PARKINSON'S DISEASE    Diabetes Father        Social History     Socioeconomic History    Marital status:      Spouse name: Not on file    Number of children: Not on file    Years of education: Not on file    Highest education level: Not on file   Occupational History    Not on file   Tobacco Use    Smoking status: Never    Smokeless tobacco: Never   Vaping Use    Vaping Use: Never used   Substance and Sexual Activity    Alcohol use: Not Currently    Drug use: Never    Sexual activity: Not on file   Other Topics Concern    Not on file   Social History Narrative    Not on file     Social Determinants of Health     Financial Resource Strain: Low Risk  (5/1/2024)    Overall Financial Resource Strain (CARDIA)     Difficulty of Paying Living Expenses: Not very hard   Food Insecurity: No Food Insecurity (5/1/2024)    Hunger Vital Sign     Worried About Running Out of Food in the Last Year: Never true     Ran Out of Food in the Last Year: Never true   Transportation Needs: No Transportation Needs (5/1/2024)    PRAPARE - Transportation     Lack of Transportation (Medical): No     Lack of Transportation (Non-Medical): No   Physical Activity: Insufficiently Active (5/1/2024)    Exercise Vital Sign     Days of Exercise per Week: 2 days     Minutes of Exercise per Session: 30 min   Stress: No Stress Concern Present (5/1/2024)    [employer] - Occupational Stress Questionnaire     Feeling of Stress : Only a little   Social Connections: Moderately Isolated (5/1/2024)    Social Connection and Isolation Panel [NHANES]     Frequency of Communication with Friends and Family: Never     Frequency of Social Gatherings with Friends and Family: Three times a week     Attends Mu-ism Services: Never     Active Member of Clubs or Organizations: No     Attends Club or Organization Meetings: Never      "Marital Status:    Intimate Partner Violence: Not on file   Housing Stability: Unknown (5/1/2024)    Housing Stability Vital Sign     Unable to Pay for Housing in the Last Year: No     Number of Places Lived in the Last Year: Not on file     Unstable Housing in the Last Year: No       OBJECTIVE:   Vitals:    05/21/24 1109   BP: 94/61   Pulse: 60   Resp: 16   SpO2: 96%   Weight: 63.6 kg (140 lb 3.2 oz)   Height: 1.854 m (6' 1")        Physical Exam  Constitutional:       Comments: cachetic   HENT:      Head: Normocephalic.   Eyes:      Extraocular Movements: Extraocular movements intact.   Pulmonary:      Effort: Pulmonary effort is normal.   Abdominal:      Palpations: Abdomen is soft.   Musculoskeletal:      Cervical back: Neck supple.   Skin:     Comments: Well healed right subclavicular incision   Neurological:      Mental Status: He is alert. Mental status is at baseline.   Psychiatric:         Behavior: Behavior normal.        Diagnostic results:   Device personally interrogated   Percept PC [id number]  [id number]  Battery life 23%  Less than 3 months estimated remaining (based on last 7 days)     ASSESSMENT & PLAN:   Reece Hunter III is a 58 y.o. male referred by Dr. Lopez for DBS pulse generator at end of service.     He has fewer than 3 months of generator life remaining.     We had a pleasant conversation about the risks, benefits, and alteratives to surgery. Risks include bleeding, pain, infection, scarring, need for further procedure, failure to improve neurologically, damage to existing components, death, paralysis. Informed consent was obtained of the patient with his wife present.     Preoperative labs were ordered today. I will ask [facility] to give him instructions with respect to bactroban and hibiclens.     I have encouraged the patient to contact the clinic with any questions, concerns, or adverse clinical change.     Diana Vera MD, FAANS, FCNS    Stereotactic & Functional Neurosurgery     "

## 2024-05-23 DIAGNOSIS — G20.A1 PARKINSON'S DISEASE, UNSPECIFIED WHETHER DYSKINESIA PRESENT, UNSPECIFIED WHETHER MANIFESTATIONS FLUCTUATE: Primary | ICD-10-CM

## 2024-05-24 RX ORDER — MUPIROCIN 20 MG/G
OINTMENT TOPICAL 2 TIMES DAILY
Qty: 1 EACH | Refills: 0 | Status: SHIPPED | OUTPATIENT
Start: 2024-05-24 | End: 2024-05-29

## 2024-06-05 NOTE — PRE-PROCEDURE INSTRUCTIONS
Pt reviewed by ELIAS RN on 6/5/24. OK to proceed at [facility].

## 2024-06-17 DIAGNOSIS — Z01.818 PRE-OP EXAM: Primary | ICD-10-CM

## 2024-06-18 ENCOUNTER — ANESTHESIA EVENT (OUTPATIENT)
Dept: SURGERY | Facility: HOSPITAL | Age: 59
End: 2024-06-18
Payer: MEDICARE

## 2024-06-18 DIAGNOSIS — Z51.81 MONITORING FOR ANTICOAGULANT USE: ICD-10-CM

## 2024-06-18 DIAGNOSIS — Z79.01 MONITORING FOR ANTICOAGULANT USE: ICD-10-CM

## 2024-06-18 DIAGNOSIS — Z01.818 PRE-OP EXAM: Primary | ICD-10-CM

## 2024-06-18 NOTE — ANESTHESIA PREPROCEDURE EVALUATION
06/18/2024  [facility]  Anesthesia Pre-Operative Evaluation         Patient Name: Reece Hunter III  YOB: 1965  MRN: [id number]    SUBJECTIVE:     Pre-operative evaluation for Procedure(s) (LRB):  REPLACEMENT, BATTERY, DEEP BRAIN STIMULATOR (Right)     06/18/2024    Reece Hunter III is a 58 y.o. male w/ a significant PMHx of parkinson's dz, GERD, debility (wheelchair bound), PEG tube.   Patient now presents for the above procedure(s).    TTE:   none documented.     Patient Active Problem List   Diagnosis    Parkinson's disease    Sleep disorder    Pseudobulbar affect    Vertigo    Impulse control disorder    Oropharyngeal dysphagia       Review of patient's allergies indicates:   Allergen Reactions    Doxycycline        Current Inpatient Medications:      No current facility-administered medications on file prior to encounter.     Current Outpatient Medications on File Prior to Encounter   Medication Sig Dispense Refill    amantadine HCl (SYMMETREL) 100 mg capsule TAKE ONE CAPSULE BY MOUTH TWICE A  capsule 3    carbidopa-levodopa-entacapone 37.5-150-200 mg (STALEVO) 37.5-150-200 mg Tab Take 1 tablet by mouth 4 (four) times daily. 120 tablet 11    clonazePAM (KLONOPIN) 0.5 MG tablet Take 0.5 mg by mouth nightly as needed for Anxiety.      pramipexole (MIRAPEX) 0.25 MG tablet Take 1 tablet (0.25 mg total) by mouth 3 (three) times daily. 90 tablet 11    selegiline HCl (ELDEPRYL) 5 mg tablet TAKE TWO TABLETS BY MOUTH ONCE DAILY 60 tablet 5       No past surgical history on file.    OBJECTIVE:     Vital Signs Range (Last 24H):         Significant Labs:  Lab Results   Component Value Date    CHOL 179 03/31/2022    TRIG 75 03/31/2022    HDL 41 03/31/2022       Diagnostic Studies: No relevant studies.    EKG:   No results found for this or any previous  visit.    ASSESSMENT/PLAN:           Pre-op Assessment    I have reviewed the Patient Summary Reports.     I have reviewed the Nursing Notes. I have reviewed the NPO Status.   I have reviewed the Medications.     Review of Systems  Anesthesia Hx:  No problems with previous Anesthesia             Denies Family Hx of Anesthesia complications.    Denies Personal Hx of Anesthesia complications.                    Social:  Non-Smoker       Hematology/Oncology:  Hematology Normal   Oncology Normal                                   EENT/Dental:  EENT/Dental Normal           Cardiovascular:  Exercise tolerance: poor      Denies MI.  Denies CAD.    Denies CABG/stent.            Wheelchair bound  Sometimes uses walker at home.                          Pulmonary:         Insomnia                Renal/:  Renal/ Normal                 Hepatic/GI:     GERD             Neurological:    Neuromuscular Disease, (parkinson's dz)            Dementia                         Endocrine:  Endocrine Normal            Psych:  Psychiatric History                  Physical Exam  General: Cooperative and Alert    Airway:  Mallampati: III   Mouth Opening: Normal  TM Distance: Normal  Tongue: Normal  Neck ROM: Extension Decreased    Dental:  Intact        Anesthesia Plan  Type of Anesthesia, risks & benefits discussed:    Anesthesia Type: Gen Natural Airway  Intra-op Monitoring Plan: Standard ASA Monitors  Post Op Pain Control Plan: multimodal analgesia  Induction:  IV  Informed Consent: Informed consent signed with the Patient and all parties understand the risks and agree with anesthesia plan.  All questions answered.   ASA Score: 3  Day of Surgery Review of History & Physical: H&P Update referred to the surgeon/provider.    Ready For Surgery From Anesthesia Perspective.     .

## 2024-06-18 NOTE — PRE-PROCEDURE INSTRUCTIONS
The following was discussed with pt via phone and sent to pt portal. Pt verbalized understanding. Pt to be accompanied by his wife.    Dear Reece ,    You are scheduled for a procedure with Dr. Vera on 6/19/2024. Your scheduled arrival time is 5:15am.  This arrival time is roughly 2 hours before your anticipated procedure time to allow sufficient time for pre-op.  Please wear comfortable clothes.  This procedure will take place at the [facility] at the corner of [facility].  [facility] [place] [facility] to [facility].  The address is:    [address].  [place] [address]    After entering the building, you will proceed to the second floor where you can check in with registration. You should take any medications that you routinely take for blood pressure (other than those listed below), heart medications, thyroid, cholesterol, etc.     If you wear contact lenses, please wear glasses to your procedure.    Your fasting instructions are as follow:  Nothing to eat after midnight the evening before your surgery. You may drink clear liquids up until 2 hours prior to your arrival time. You MUST have a responsible adult to bring you home.      The evening before and morning of your procedure, please hold the following medications:  -Aspirin and Aspirin-containing products (Goody's powder, Excedrin)  -NSAIDs (Advil, Ibuprofen, Aleve, Diclofenac)  -Vitamins/Supplements  -Herbal remedies/Teas  -Stimulants (Adderall, Vyvanse, Adipex)  -Diabetic medication (Please bring with you day of procedure)  -Prescription creams/gels/lotions    -May take Tylenol      The evening before and morning of your procedure, take a shower using antibacterial soap (ex: Hibiclens or Dial antibacterial soap). DO NOT apply deodorant, lotion, cologne, or anything else to the skin. Wear loose, comfortable fitting clothing. Do not wear jewelry or bring any valuables with you. If you wear  dentures or contacts, please bring your case with you or leave them at home. Use and bring any inhalers that you may have.    If you have any procedure-specific questions, please call your surgeon's office. Any other questions, don't hesitate to call at [phone].    Thanks,  ALIYA Orozco  Pre-Admit Testing  Anesthesia Dept [facility]

## 2024-06-19 ENCOUNTER — ANESTHESIA (OUTPATIENT)
Dept: SURGERY | Facility: HOSPITAL | Age: 59
End: 2024-06-19
Payer: MEDICARE

## 2024-06-19 ENCOUNTER — HOSPITAL ENCOUNTER (OUTPATIENT)
Facility: HOSPITAL | Age: 59
Discharge: HOME | End: 2024-06-19
Attending: NEUROLOGICAL SURGERY | Admitting: NEUROLOGICAL SURGERY
Payer: MEDICARE

## 2024-06-19 VITALS
SYSTOLIC BLOOD PRESSURE: 110 MMHG | TEMPERATURE: 98 F | DIASTOLIC BLOOD PRESSURE: 67 MMHG | HEART RATE: 50 BPM | RESPIRATION RATE: 12 BRPM | HEIGHT: 73 IN | BODY MASS INDEX: 18.82 KG/M2 | OXYGEN SATURATION: 97 % | WEIGHT: 142 LBS

## 2024-06-19 DIAGNOSIS — Z45.42 END OF BATTERY LIFE OF DEEP BRAIN STIMULATOR: ICD-10-CM

## 2024-06-19 PROCEDURE — 63600175 PHARM REV CODE 636 W HCPCS: Performed by: STUDENT IN AN ORGANIZED HEALTH CARE EDUCATION/TRAINING PROGRAM

## 2024-06-19 PROCEDURE — 25000003 PHARM REV CODE 250: Performed by: NEUROLOGICAL SURGERY

## 2024-06-19 PROCEDURE — 71000015 HC POSTOP RECOV 1ST HR: Performed by: NEUROLOGICAL SURGERY

## 2024-06-19 PROCEDURE — 71000033 HC RECOVERY, INTIAL HOUR: Performed by: NEUROLOGICAL SURGERY

## 2024-06-19 PROCEDURE — 25000003 PHARM REV CODE 250: Performed by: STUDENT IN AN ORGANIZED HEALTH CARE EDUCATION/TRAINING PROGRAM

## 2024-06-19 PROCEDURE — C1767 GENERATOR, NEURO NON-RECHARG: HCPCS | Performed by: NEUROLOGICAL SURGERY

## 2024-06-19 PROCEDURE — 99900035 HC TECH TIME PER 15 MIN (STAT)

## 2024-06-19 PROCEDURE — 25000003 PHARM REV CODE 250

## 2024-06-19 PROCEDURE — 37000008 HC ANESTHESIA 1ST 15 MINUTES: Performed by: NEUROLOGICAL SURGERY

## 2024-06-19 PROCEDURE — 27201423 OPTIME MED/SURG SUP & DEVICES STERILE SUPPLY: Performed by: NEUROLOGICAL SURGERY

## 2024-06-19 PROCEDURE — 36000710: Performed by: NEUROLOGICAL SURGERY

## 2024-06-19 PROCEDURE — 61885 INSRT/REDO NEUROSTIM 1 ARRAY: CPT | Mod: ,,, | Performed by: NEUROLOGICAL SURGERY

## 2024-06-19 PROCEDURE — 36000711: Performed by: NEUROLOGICAL SURGERY

## 2024-06-19 PROCEDURE — 94761 N-INVAS EAR/PLS OXIMETRY MLT: CPT

## 2024-06-19 PROCEDURE — 37000009 HC ANESTHESIA EA ADD 15 MINS: Performed by: NEUROLOGICAL SURGERY

## 2024-06-19 PROCEDURE — 63600175 PHARM REV CODE 636 W HCPCS: Performed by: NEUROLOGICAL SURGERY

## 2024-06-19 DEVICE — NEUROSTIMULATR PERCEPT 58X51MM: Type: IMPLANTABLE DEVICE | Site: CHEST | Status: FUNCTIONAL

## 2024-06-19 RX ORDER — CHOLESTYRAMINE 4 G/5.5G
POWDER, FOR SUSPENSION ORAL
COMMUNITY
Start: 2024-02-23

## 2024-06-19 RX ORDER — MUPIROCIN 20 MG/G
1 OINTMENT TOPICAL 2 TIMES DAILY
Status: DISCONTINUED | OUTPATIENT
Start: 2024-06-19 | End: 2024-06-19 | Stop reason: HOSPADM

## 2024-06-19 RX ORDER — MUPIROCIN 20 MG/G
OINTMENT TOPICAL
Status: DISCONTINUED | OUTPATIENT
Start: 2024-06-19 | End: 2024-06-19 | Stop reason: HOSPADM

## 2024-06-19 RX ORDER — HYDROMORPHONE HYDROCHLORIDE 1 MG/ML
0.2 INJECTION, SOLUTION INTRAMUSCULAR; INTRAVENOUS; SUBCUTANEOUS EVERY 5 MIN PRN
Status: CANCELLED | OUTPATIENT
Start: 2024-06-19

## 2024-06-19 RX ORDER — ESCITALOPRAM OXALATE 5 MG/1
5 TABLET ORAL
COMMUNITY

## 2024-06-19 RX ORDER — SODIUM CHLORIDE 9 MG/ML
INJECTION, SOLUTION INTRAVENOUS CONTINUOUS
Status: DISCONTINUED | OUTPATIENT
Start: 2024-06-19 | End: 2024-06-19 | Stop reason: HOSPADM

## 2024-06-19 RX ORDER — CEPHALEXIN 500 MG/1
500 CAPSULE ORAL EVERY 6 HOURS
Qty: 20 CAPSULE | Refills: 0 | Status: SHIPPED | OUTPATIENT
Start: 2024-06-19 | End: 2024-06-19 | Stop reason: HOSPADM

## 2024-06-19 RX ORDER — MIDODRINE HYDROCHLORIDE 10 MG/1
1 TABLET ORAL 3 TIMES DAILY
COMMUNITY
Start: 2024-05-21 | End: 2025-05-21

## 2024-06-19 RX ORDER — OXYCODONE HYDROCHLORIDE 5 MG/1
5 TABLET ORAL
Status: CANCELLED | OUTPATIENT
Start: 2024-06-19

## 2024-06-19 RX ORDER — ARGININE/GLUTAMINE/CALCIUM BMB 7G-7G-1.5G
1 POWDER IN PACKET (EA) ORAL DAILY
Qty: 14 EACH | Refills: 0 | Status: SHIPPED | OUTPATIENT
Start: 2024-06-19 | End: 2024-07-03

## 2024-06-19 RX ORDER — HYDROCODONE BITARTRATE AND ACETAMINOPHEN 5; 325 MG/1; MG/1
1 TABLET ORAL EVERY 6 HOURS PRN
Qty: 20 TABLET | Refills: 0 | Status: SHIPPED | OUTPATIENT
Start: 2024-06-19 | End: 2024-06-24

## 2024-06-19 RX ORDER — HYDROCODONE BITARTRATE AND ACETAMINOPHEN 7.5; 325 MG/1; MG/1
TABLET ORAL
Status: ON HOLD | COMMUNITY
End: 2024-06-19 | Stop reason: HOSPADM

## 2024-06-19 RX ORDER — SODIUM CHLORIDE 9 MG/ML
INJECTION, SOLUTION INTRAVENOUS CONTINUOUS PRN
Status: DISCONTINUED | OUTPATIENT
Start: 2024-06-19 | End: 2024-06-19

## 2024-06-19 RX ORDER — ONDANSETRON HYDROCHLORIDE 2 MG/ML
4 INJECTION, SOLUTION INTRAVENOUS DAILY PRN
Status: CANCELLED | OUTPATIENT
Start: 2024-06-19

## 2024-06-19 RX ORDER — SODIUM CHLORIDE 0.9 % (FLUSH) 0.9 %
10 SYRINGE (ML) INJECTION DAILY PRN
Status: DISCONTINUED | OUTPATIENT
Start: 2024-06-19 | End: 2024-06-19 | Stop reason: HOSPADM

## 2024-06-19 RX ORDER — SULFAMETHOXAZOLE AND TRIMETHOPRIM 800; 160 MG/1; MG/1
1 TABLET ORAL 2 TIMES DAILY
Qty: 20 TABLET | Refills: 0 | Status: SHIPPED | OUTPATIENT
Start: 2024-06-19 | End: 2024-06-29

## 2024-06-19 RX ORDER — LIDOCAINE HYDROCHLORIDE AND EPINEPHRINE 10; 10 MG/ML; UG/ML
INJECTION, SOLUTION INFILTRATION; PERINEURAL
Status: DISCONTINUED | OUTPATIENT
Start: 2024-06-19 | End: 2024-06-19 | Stop reason: HOSPADM

## 2024-06-19 RX ORDER — PROPOFOL 10 MG/ML
VIAL (ML) INTRAVENOUS CONTINUOUS PRN
Status: DISCONTINUED | OUTPATIENT
Start: 2024-06-19 | End: 2024-06-19

## 2024-06-19 RX ORDER — CEFTRIAXONE 1 G/1
INJECTION, POWDER, FOR SOLUTION INTRAMUSCULAR; INTRAVENOUS
Status: DISCONTINUED | OUTPATIENT
Start: 2024-06-19 | End: 2024-06-19 | Stop reason: HOSPADM

## 2024-06-19 RX ADMIN — PROPOFOL 50 MCG/KG/MIN: 10 INJECTION, EMULSION INTRAVENOUS at 07:06

## 2024-06-19 RX ADMIN — SODIUM CHLORIDE: 9 INJECTION, SOLUTION INTRAVENOUS at 06:06

## 2024-06-19 RX ADMIN — MUPIROCIN: 20 OINTMENT TOPICAL at 05:06

## 2024-06-19 RX ADMIN — DEXTROSE 2 G: 50 INJECTION, SOLUTION INTRAVENOUS at 07:06

## 2024-06-19 RX ADMIN — SODIUM CHLORIDE: 0.9 INJECTION, SOLUTION INTRAVENOUS at 06:06

## 2024-06-19 RX ADMIN — PROPOFOL 20 MG: 10 INJECTION, EMULSION INTRAVENOUS at 07:06

## 2024-06-19 NOTE — TRANSFER OF CARE
"Anesthesia Transfer of Care Note    Patient: Reece Hunter III    Procedure(s) Performed: Procedure(s) (LRB):  REPLACEMENT, BATTERY, DEEP BRAIN STIMULATOR (Right)    Patient location: PACU    Anesthesia Type: general    Transport from OR: Transported from OR on 2-3 L/min O2 by NC with adequate spontaneous ventilation    Post pain: adequate analgesia    Post assessment: no apparent anesthetic complications    Post vital signs: stable    Level of consciousness: alert    Nausea/Vomiting: no nausea/vomiting    Complications: none    Transfer of care protocol was followed      Last vitals: Visit Vitals  BP 94/70 (BP Location: Right arm, Patient Position: Lying)   Pulse (!) 56   Temp 36.6 °C (97.9 °F) (Temporal)   Resp 12   Ht 6' 1" (1.854 m)   Wt 64.4 kg (142 lb)   SpO2 98%   BMI 18.73 kg/m²     "

## 2024-06-19 NOTE — CARE UPDATE
Certification of Assistant at Surgery       Surgery Date: 6/19/2024     Participating Surgeons:  Surgeons and Role:     * Diana Vera MD - Primary    Procedures:  Procedure(s) (LRB):  REPLACEMENT, BATTERY, DEEP BRAIN STIMULATOR (Right)    Assistant Surgeon's Certification of Necessity:  I understand that section 1842 (b) (6) (d) of the Social Security Act generally prohibits Medicare Part B reasonable charge payment for the services of assistants at surgery in teaching hospitals when qualified residents are available to furnish such services. I certify that the services for which payment is claimed were medically necessary, and that no qualified resident was available to perform the services. I further understand that these services are subject to post-payment review by the Medicare carrier.      Kyung Myers PA-C    06/19/2024  1:38 PM

## 2024-06-19 NOTE — DISCHARGE INSTRUCTIONS
Wound care:    Keep incisions dry. Do not soak under water (bathtub, swimming pool, etc.). Please shower with baby shampoo, but do not take a bath. If the incision becomes wet, gently pat it dry with a clean towel; do not rub.    Remove outer dressing after 48 hours. There are Steri-Strips (butterfly bandages) underneath. Keep clean and dry for 14 days (cover with saran wrap while showering). It is OK if the Steri-Strips fall off on their own before then, but please do not remove them yourself. If they are still on after 14 days, you may gently remove them in the shower. Do not place any ointment over the Steri-Strips.    Other post-op instructions:    No lifting anything heavier than a gallon of milk until cleared in post-operative visit.    You now have an implanted device in place. It is imperative that any infection (such as a urinary tract infection) be treated immediately so that it cannot get into your bloodstream. If an infection ends up in your blood, it may infect the device, thus requiring us to remove it.

## 2024-06-19 NOTE — BRIEF OP NOTE
[facility]  Brief Operative Note    Surgery Date: 6/19/2024     Surgeons and Role:     * Diana Vera MD - Primary    Assisting Surgeon: None    Pre-op Diagnosis:  Parkinson's disease, unspecified whether dyskinesia present, unspecified whether manifestations fluctuate [G20.A1]  Malnutrition     Post-op Diagnosis:  Post-Op Diagnosis Codes:     * Parkinson's disease, unspecified whether dyskinesia present, unspecified whether manifestations fluctuate [G20.A1]  Malnutrition     Procedure(s) (LRB):  REPLACEMENT, BATTERY, DEEP BRAIN STIMULATOR (Right)    Anesthesia: Local MAC    Operative Findings: See full operative report    Estimated Blood Loss: * No values recorded between 6/19/2024  7:24 AM and 6/19/2024  8:06 AM *         Specimens:   Specimen (24h ago, onward)      None              Discharge Note    OUTCOME: Patient tolerated treatment/procedure well without complication and is now ready for discharge.    DISPOSITION: Home or Self Care    FINAL DIAGNOSIS:  <principal problem not specified>    FOLLOWUP: In clinic    DISCHARGE INSTRUCTIONS:  No discharge procedures on file.    Wound care:    Keep incisions dry. Do not soak under water (bathtub, swimming pool, etc.). Please shower with baby shampoo, but do not take a bath. If the incision becomes wet, gently pat it dry with a clean towel; do not rub.    Remove outer dressing after 48 hours. There are Steri-Strips (butterfly bandages) underneath. Keep clean and dry for 14 days (cover with saran wrap while showering). It is OK if the Steri-Strips fall off on their own before then, but please do not remove them yourself. If they are still on after 14 days, you may gently remove them in the shower. Do not place any ointment over the Steri-Strips.    Other post-op instructions:    No lifting anything heavier than a gallon of milk until cleared in post-operative visit.    You now have an implanted device in place. It is imperative that  any infection (such as a urinary tract infection) be treated immediately so that it cannot get into your bloodstream. If an infection ends up in your blood, it may infect the device, thus requiring us to remove it.

## 2024-06-19 NOTE — PLAN OF CARE
Chart reviewed. Pre-op nursing care completed per orders. Safe surgery checklist complete. Family at bedside, pt belongings with spouse per her request. Waiting for H&P and anesthesia consent prior to procedure. Call button within reach.

## 2024-06-19 NOTE — ANESTHESIA POSTPROCEDURE EVALUATION
Anesthesia Post Evaluation    Patient: Reece Hunter III    Procedure(s) Performed: Procedure(s) (LRB):  REPLACEMENT, BATTERY, DEEP BRAIN STIMULATOR (Right)    Final Anesthesia Type: general      Patient location during evaluation: PACU  Patient participation: Yes- Able to Participate  Level of consciousness: awake  Post-procedure vital signs: reviewed and stable  Pain management: adequate  Airway patency: patent  GERMAIN mitigation strategies: Multimodal analgesia  PONV status at discharge: No PONV  Anesthetic complications: no      Cardiovascular status: blood pressure returned to baseline and hemodynamically stable  Respiratory status: unassisted and spontaneous ventilation  Hydration status: euvolemic  Follow-up not needed.              Vitals Value Taken Time   /67 06/19/24 0831   Temp 36.6 °C (97.9 °F) 06/19/24 0808   Pulse 53 06/19/24 0839   Resp 8 06/19/24 0839   SpO2 96 % 06/19/24 0839   Vitals shown include unfiled device data.      No case tracking events are documented in the log.      Pain/Blake Score: Blake Score: 10 (6/19/2024  8:08 AM)

## 2024-06-19 NOTE — OP NOTE
[facility]  Neurosurgery  Operative Note    SUMMARY      Date of Procedure: 6/19/2024     Procedure: Procedure(s) (LRB):  REPLACEMENT, BATTERY, DEEP BRAIN STIMULATOR (Right)     Surgeons and Role:     * Diana Vera MD - Primary    Assisting Surgeon: Kyung Myers PA-C (Ms. Myers assisted in all aspects of the procedure, from positioning to closing)     Pre-Operative Diagnosis: Parkinson's disease, unspecified whether dyskinesia present, unspecified whether manifestations fluctuate [G20.A1]  Unspecified proteincalorie malnutrition    Post-Operative Diagnosis: Post-Op Diagnosis Codes:     * Parkinson's disease, unspecified whether dyskinesia present, unspecified whether manifestations fluctuate [G20.A1]  Unspecified proteincalorie malnutrition    Anesthesia: Local MAC    Technical Procedures:1. Replacement of right Medtronic dual-channel pulse generator for DBS ([employer] PC [id number])   2. Interrogation and programming of device      Indications:   Reece Hunter III is a 58 y.o. male who presents with DBS pulse generator at end of service.      We had a pleasant discussion about the risks, benefits, and alternatives. Risks, benefits, and alternatives were discussed at length. Risks include, but are not limited to, bleeding, pain, infection, scarring, need for further/repeat procedure, death, paralysis,stroke/damage to major blood vessels, damage to the DBS system (especially extension cable), and hardware-related complications. We will use the PlasmaBlade to help minimize damage to any of the DBS components. Informed consent was obtained of the patient with her father present.        Description of the Procedure:   The patient was brought back to the operating room, and all pressure points were carefully padded. SCDs were applied, and Ancef was given intravenously. Monitored anesthesia care was induced by the anesthesia service. The patient was prepped and draped in the usual  sterile fashion.      Using the Plasma Blade on settings of 6 and 6, the previous incision was carefully reopened on the right. The generator was brought into view and carefully dissected out. The pocket was copiously irrigated, and meticulous hemostasis was obtained. Using the torque wrench, the prong was dissociated from the top port of the old generator and then reinserted into the top port of the fresh generator. The same process was repeated with the bottom. The bolts were locked into place with the torque wrench. The generator was then returned to the pocket and interrogated. All impedances were found to be in range on both monopolar and bipolar interrogation.      The wound was then copiously irrigated again. The incision was closed in 2 layers of 3.0 Vicryl (one layer through Loco's fascia) and then the skin was closed with a running subcuticular 4.0 Monocryl. The device was re-interrogated, and impedances were again in appropriate range (800s-3100s for monopolar). The generator was turned on at the same settings. A sterile dressing of Mastisol and steri-strips was applied. Telfa and tegaderm was applied over this.      All counts were correct x2. Antibiotic-containing irrigation was used throughout the case.     The patient was then awakened and brought to the recovery room in satisfactory condition.      Complications: No     Estimated Blood Loss (EBL): <5cc           Specimens:   Specimen (24h ago, onward)      None             Implants:   Implant Name Type Inv. Item Serial No.  Lot No. LRB No. Used Action   NEUROSTIMULATR PERCEPT [id number] - [id number]  NEUROSTIMULATR PERCEPT [id number] [employer]  Right 1 Implanted              Condition: Stable    Disposition: PACU - hemodynamically stable.    Attestation: I was present and scrubbed for the entire procedure.

## (undated) DEVICE — TUBING SUC UNIV W/CONN 12FT

## (undated) DEVICE — DURAPREP SURG SCRUB 26ML

## (undated) DEVICE — DRAPE T THYROID STERILE

## (undated) DEVICE — GLOVE BIOGEL SKINSENSE PI 6.5

## (undated) DEVICE — POSITIONER IV ARMBOARD FOAM

## (undated) DEVICE — CLOSURE SKIN STERI STRIP 1/2X4

## (undated) DEVICE — DRAPE INCISE IOBAN 2 23X17IN

## (undated) DEVICE — ADHESIVE MASTISOL VIAL 48/BX

## (undated) DEVICE — MARKER SKIN RULER STERILE

## (undated) DEVICE — Device

## (undated) DEVICE — DRAPE STERI INSTRUMENT 1018

## (undated) DEVICE — SUT MCRYL PLUS 4-0 PS2 27IN

## (undated) DEVICE — DEVICE PLASMABLADE X 3.0S LT

## (undated) DEVICE — DRESSING TELFA N ADH 3X8

## (undated) DEVICE — NDL HYPO REG 25G X 1 1/2

## (undated) DEVICE — SEE L#120831

## (undated) DEVICE — COVER PROBE US 5.5X58L NON LTX

## (undated) DEVICE — BLADE PEAK PLASMA

## (undated) DEVICE — DRESSING TRANS 4X4 TEGADERM

## (undated) DEVICE — CORD BIPOLAR 12 FOOT

## (undated) DEVICE — SYR 10CC LUER LOCK

## (undated) DEVICE — TRAY SKIN SCRUB WET PREMIUM

## (undated) DEVICE — ELECTRODE REM PLYHSV RETURN 9

## (undated) DEVICE — SUT 2/0 30IN SILK BLK BRAI

## (undated) DEVICE — UNDERGLOVES BIOGEL PI SZ 7 LF

## (undated) DEVICE — SUT VICRYL PLUS 3-0 SH 18IN

## (undated) DEVICE — DRAPE STERI-DRAPE 1000 17X11IN